# Patient Record
Sex: FEMALE | Race: WHITE | NOT HISPANIC OR LATINO | URBAN - METROPOLITAN AREA
[De-identification: names, ages, dates, MRNs, and addresses within clinical notes are randomized per-mention and may not be internally consistent; named-entity substitution may affect disease eponyms.]

---

## 2017-02-10 ENCOUNTER — HOSPITAL ENCOUNTER (OUTPATIENT)
Dept: MAMMOGRAPHY | Facility: CLINIC | Age: 52
Discharge: HOME | End: 2017-02-10
Attending: INTERNAL MEDICINE

## 2017-02-10 DIAGNOSIS — Z12.31 VISIT FOR SCREENING MAMMOGRAM: ICD-10-CM

## 2017-02-20 ENCOUNTER — HOSPITAL ENCOUNTER (OUTPATIENT)
Dept: MAMMOGRAPHY | Facility: HOSPITAL | Age: 52
Discharge: HOME | End: 2017-02-20
Attending: INTERNAL MEDICINE

## 2017-02-20 DIAGNOSIS — N64.89 BREAST ASYMMETRY: ICD-10-CM

## 2018-02-12 ENCOUNTER — HOSPITAL ENCOUNTER (OUTPATIENT)
Dept: MAMMOGRAPHY | Facility: CLINIC | Age: 53
Discharge: HOME | End: 2018-02-12
Attending: INTERNAL MEDICINE

## 2018-02-12 DIAGNOSIS — Z12.31 VISIT FOR SCREENING MAMMOGRAM: ICD-10-CM

## 2019-05-06 ENCOUNTER — HOSPITAL ENCOUNTER (OUTPATIENT)
Dept: MAMMOGRAPHY | Facility: CLINIC | Age: 54
Discharge: HOME | End: 2019-05-06
Attending: INTERNAL MEDICINE

## 2019-05-06 DIAGNOSIS — Z12.31 VISIT FOR SCREENING MAMMOGRAM: ICD-10-CM

## 2020-10-05 ENCOUNTER — COMMUNICATION - HEALTHEAST (OUTPATIENT)
Dept: SCHEDULING | Facility: CLINIC | Age: 55
End: 2020-10-05

## 2020-10-12 ENCOUNTER — OFFICE VISIT - HEALTHEAST (OUTPATIENT)
Dept: FAMILY MEDICINE | Facility: CLINIC | Age: 55
End: 2020-10-12

## 2020-10-12 DIAGNOSIS — M41.9 SCOLIOSIS, UNSPECIFIED SCOLIOSIS TYPE, UNSPECIFIED SPINAL REGION: ICD-10-CM

## 2020-10-12 DIAGNOSIS — M54.50 BILATERAL LOW BACK PAIN WITHOUT SCIATICA, UNSPECIFIED CHRONICITY: ICD-10-CM

## 2020-10-12 RX ORDER — AMOXICILLIN 500 MG/1
CAPSULE ORAL PRN
Status: SHIPPED | COMMUNITY
Start: 2019-02-01

## 2020-10-12 ASSESSMENT — MIFFLIN-ST. JEOR: SCORE: 1202.37

## 2021-05-29 ENCOUNTER — RECORDS - HEALTHEAST (OUTPATIENT)
Dept: ADMINISTRATIVE | Facility: CLINIC | Age: 56
End: 2021-05-29

## 2021-05-30 ENCOUNTER — RECORDS - HEALTHEAST (OUTPATIENT)
Dept: ADMINISTRATIVE | Facility: CLINIC | Age: 56
End: 2021-05-30

## 2021-06-01 ENCOUNTER — RECORDS - HEALTHEAST (OUTPATIENT)
Dept: ADMINISTRATIVE | Facility: CLINIC | Age: 56
End: 2021-06-01

## 2021-06-05 VITALS
DIASTOLIC BLOOD PRESSURE: 82 MMHG | HEART RATE: 52 BPM | BODY MASS INDEX: 18.99 KG/M2 | RESPIRATION RATE: 12 BRPM | WEIGHT: 125.3 LBS | HEIGHT: 68 IN | SYSTOLIC BLOOD PRESSURE: 122 MMHG

## 2021-06-12 NOTE — TELEPHONE ENCOUNTER
She was doing yoga, leaned to her side and her back went out. It's lower back pain she rates at 5-8, using Extra Strength Tylenol and ibuprofen, alternating. I connected her with scheduling for an appointment today.  Kalpana Murray RN  [facility]      Reason for Disposition    SEVERE back pain (e.g., excruciating, unable to do any normal activities) and not improved after pain medicine and CARE ADVICE    Additional Information    Negative: Passed out (i.e., fainted, collapsed and was not responding)    Negative: Shock suspected (e.g., cold/pale/clammy skin, too weak to stand, low BP, rapid pulse)    Negative: Sounds like a life-threatening emergency to the triager    Negative: Major injury to the back (e.g., MVA, fall > 10 feet or 3 meters, penetrating injury, etc.)    Negative: Pain in the upper back over the ribs (rib cage) that radiates (travels) into the chest    Negative: Pain in the upper back over the ribs (rib cage) and worsened by coughing (or clearly increases with breathing)    Negative: SEVERE back pain of sudden onset and age > 60     Pain at 5-8    Negative: SEVERE abdominal pain (e.g., excruciating)    Negative: Abdominal pain and age > 60    Negative: Unable to urinate (or only a few drops) and bladder feels very full    Negative: Loss of bladder or bowel control (urine or bowel incontinence; wetting self, leaking stool) of new onset    Negative: Numbness (loss of sensation) in groin or rectal area    Negative: Pain radiates into groin, scrotum    Negative: Blood in urine (red, pink, or tea-colored)    Negative: Vomiting and pain over lower ribs of back (i.e., flank - kidney area)    Negative: Weakness of a leg or foot (e.g., unable to bear weight, dragging foot)    Negative: Patient sounds very sick or weak to the triager    Negative: Fever > 100.5 F (38.1 C) and flank pain    Negative: Pain or burning with passing urine (urination)    Protocols used: BACK PAIN-A-[place]

## 2021-06-12 NOTE — PROGRESS NOTES
Assessment / Impression     1. Bilateral low back pain without sciatica, unspecified chronicity  Ambulatory referral to PT/OT   2. Scoliosis, unspecified scoliosis type, unspecified spinal region           Plan:     Her back pain symptoms appear to be due to muscle strain.  She is more prone to having back pain issues given her history of scoliosis in the lower spine region.  I recommended physical therapy.  It is reassuring that there are no neurological deficits or red flag warning signs on exam that would necessitate immediate imaging.  If symptoms do not improve she will let us know.  She may continue Tylenol or ibuprofen as needed.    Subjective:      HPI: Lien Barrera is a 55 y.o. female who presents to the clinic to discuss low back pain symptoms became acutely worse on 9/23/2020.  She describes having a history of scoliosis in the lower spine and has had back pain issues periodically in the past.  She remembers straining it on 9/23 and symptoms became worse during yoga later that day.  Symptoms are worse on the left side.  She denies having symptoms radiating down the legs.  She has taken ibuprofen and Tylenol which provide some relief.  She has also applied ice, worked on stretching and has taken Epsom salt baths.  She feels like symptoms have finally started to improve a little today.        Review of Systems  All other systems reviewed and are negative.     Social History     Tobacco Use   Smoking Status Never Smoker   Smokeless Tobacco Never Used       Family History   Problem Relation Age of Onset     Breast cancer Maternal Aunt 60     No Medical Problems Mother      No Medical Problems Father      No Medical Problems Sister      No Medical Problems Daughter      No Medical Problems Maternal Grandmother      No Medical Problems Maternal Grandfather      No Medical Problems Paternal Grandmother      No Medical Problems Paternal Grandfather      No Medical Problems Paternal Aunt      BRCA 1/2 Neg Hx   "    Cancer Neg Hx      Colon cancer Neg Hx      Endometrial cancer Neg Hx      Ovarian cancer Neg Hx        Objective:     /82 (Patient Site: Right Arm, Patient Position: Sitting, Cuff Size: Adult Regular)   Pulse (!) 52   Resp 12   Ht 5' 7.72\" (1.72 m)   Wt 125 lb 4.8 oz (56.8 kg)   BMI 19.21 kg/m    Physical Examination: General appearance - alert, well appearing, and in no distress  Eyes:  extraocular eye movements intact  Lungs: clear to auscultation, no wheezes, rales or rhonchi, symmetric air entry  Heart: normal rate, regular rhythm, normal S1, S2, no murmurs, rubs, clicks or gallops  Neurological: alert, oriented, normal speech, no focal findings or movement disorder noted.    Extremities: No edema, no clubbing or cyanosis.  Straight leg raise negative bilaterally.  Internal and external hip rotation normal bilaterally.  Psychiatric: Normal affect. Does not appear anxious or depressed.  Back: Nontender over the thoracic and lumbar spinous processes.  There is mild tenderness over the lumbar paraspinal muscles which are tight to palpation.  Nontender over the sacrum and SI joints.  No results found for this or any previous visit (from the past 168 hour(s)).    Current Outpatient Medications   Medication Sig     acetaminophen (TYLENOL ORAL) Take by mouth as needed.     amoxicillin (AMOXIL) 500 MG capsule as needed (before dental appointments.).      IBUPROFEN ORAL Take by mouth as needed.     MULTIVITAMIN ORAL Take by mouth daily.     "

## 2021-07-21 ENCOUNTER — RECORDS - HEALTHEAST (OUTPATIENT)
Dept: ADMINISTRATIVE | Facility: CLINIC | Age: 56
End: 2021-07-21